# Patient Record
Sex: FEMALE
[De-identification: names, ages, dates, MRNs, and addresses within clinical notes are randomized per-mention and may not be internally consistent; named-entity substitution may affect disease eponyms.]

---

## 2020-04-28 ENCOUNTER — NURSE TRIAGE (OUTPATIENT)
Dept: OTHER | Facility: CLINIC | Age: 25
End: 2020-04-28

## 2020-04-29 NOTE — TELEPHONE ENCOUNTER
Had  on 3/8/2020, having vaginally bleeding. Stopped post partum  Bleeding, has had period since baby was born. States it is more like spotting. Bleeding is not painful. Instructed to call her OBGYN office and speak with the on-call physician tonight.     Reason for Disposition   [1] Menstrual cycle < 21 days OR > 35 days AND [2] occurs more than two cycles (2 months) this past year    Protocols used: VAGINAL BLEEDING - ABNORMAL-ADULT-

## 2023-10-20 ENCOUNTER — HOSPITAL ENCOUNTER (EMERGENCY)
Facility: HOSPITAL | Age: 28
Discharge: HOME | End: 2023-10-20
Payer: COMMERCIAL

## 2023-10-20 VITALS
HEART RATE: 95 BPM | DIASTOLIC BLOOD PRESSURE: 70 MMHG | OXYGEN SATURATION: 100 % | SYSTOLIC BLOOD PRESSURE: 115 MMHG | BODY MASS INDEX: 21.71 KG/M2 | RESPIRATION RATE: 20 BRPM | HEIGHT: 61 IN | WEIGHT: 115 LBS | TEMPERATURE: 96.8 F

## 2023-10-20 LAB
ALBUMIN SERPL BCP-MCNC: 4.9 G/DL (ref 3.4–5)
ALP SERPL-CCNC: 67 U/L (ref 33–110)
ALT SERPL W P-5'-P-CCNC: 20 U/L (ref 7–45)
ANION GAP SERPL CALC-SCNC: 19 MMOL/L (ref 10–20)
APPEARANCE UR: ABNORMAL
AST SERPL W P-5'-P-CCNC: 20 U/L (ref 9–39)
BASOPHILS # BLD AUTO: 0.04 X10*3/UL (ref 0–0.1)
BASOPHILS NFR BLD AUTO: 0.2 %
BILIRUB SERPL-MCNC: 0.9 MG/DL (ref 0–1.2)
BILIRUB UR STRIP.AUTO-MCNC: NEGATIVE MG/DL
BUN SERPL-MCNC: 17 MG/DL (ref 6–23)
CALCIUM SERPL-MCNC: 9.9 MG/DL (ref 8.6–10.3)
CHLORIDE SERPL-SCNC: 105 MMOL/L (ref 98–107)
CO2 SERPL-SCNC: 19 MMOL/L (ref 21–32)
COLOR UR: YELLOW
CREAT SERPL-MCNC: 1.02 MG/DL (ref 0.5–1.05)
EOSINOPHIL # BLD AUTO: 0 X10*3/UL (ref 0–0.7)
EOSINOPHIL NFR BLD AUTO: 0 %
ERYTHROCYTE [DISTWIDTH] IN BLOOD BY AUTOMATED COUNT: 12.3 % (ref 11.5–14.5)
GFR SERPL CREATININE-BSD FRML MDRD: 77 ML/MIN/1.73M*2
GLUCOSE SERPL-MCNC: 107 MG/DL (ref 74–99)
GLUCOSE UR STRIP.AUTO-MCNC: NEGATIVE MG/DL
HCG UR QL IA.RAPID: NEGATIVE
HCT VFR BLD AUTO: 39.4 % (ref 36–46)
HGB BLD-MCNC: 12.9 G/DL (ref 12–16)
HOLD SPECIMEN: NORMAL
IMM GRANULOCYTES # BLD AUTO: 0.09 X10*3/UL (ref 0–0.7)
IMM GRANULOCYTES NFR BLD AUTO: 0.4 % (ref 0–0.9)
KETONES UR STRIP.AUTO-MCNC: ABNORMAL MG/DL
LEUKOCYTE ESTERASE UR QL STRIP.AUTO: ABNORMAL
LIPASE SERPL-CCNC: 28 U/L (ref 9–82)
LYMPHOCYTES # BLD AUTO: 0.89 X10*3/UL (ref 1.2–4.8)
LYMPHOCYTES NFR BLD AUTO: 4.2 %
MCH RBC QN AUTO: 28.7 PG (ref 26–34)
MCHC RBC AUTO-ENTMCNC: 32.7 G/DL (ref 32–36)
MCV RBC AUTO: 88 FL (ref 80–100)
MONOCYTES # BLD AUTO: 1.19 X10*3/UL (ref 0.1–1)
MONOCYTES NFR BLD AUTO: 5.6 %
NEUTROPHILS # BLD AUTO: 19.12 X10*3/UL (ref 1.2–7.7)
NEUTROPHILS NFR BLD AUTO: 89.6 %
NITRITE UR QL STRIP.AUTO: NEGATIVE
NRBC BLD-RTO: 0 /100 WBCS (ref 0–0)
PH UR STRIP.AUTO: 6.5 [PH]
PLATELET # BLD AUTO: 363 X10*3/UL (ref 150–450)
PMV BLD AUTO: 10.2 FL (ref 7.5–11.5)
POTASSIUM SERPL-SCNC: 3.5 MMOL/L (ref 3.5–5.3)
PROT SERPL-MCNC: 7.4 G/DL (ref 6.4–8.2)
PROT UR STRIP.AUTO-MCNC: ABNORMAL MG/DL
RBC # BLD AUTO: 4.49 X10*6/UL (ref 4–5.2)
RBC # UR STRIP.AUTO: ABNORMAL /UL
SODIUM SERPL-SCNC: 139 MMOL/L (ref 136–145)
SP GR UR STRIP.AUTO: 1.02
UROBILINOGEN UR STRIP.AUTO-MCNC: ABNORMAL MG/DL
WBC # BLD AUTO: 21.3 X10*3/UL (ref 4.4–11.3)

## 2023-10-20 PROCEDURE — 87086 URINE CULTURE/COLONY COUNT: CPT | Mod: CMCLAB,PARLAB | Performed by: NURSE PRACTITIONER

## 2023-10-20 PROCEDURE — 99284 EMERGENCY DEPT VISIT MOD MDM: CPT

## 2023-10-20 PROCEDURE — 99283 EMERGENCY DEPT VISIT LOW MDM: CPT

## 2023-10-20 PROCEDURE — 81003 URINALYSIS AUTO W/O SCOPE: CPT | Performed by: NURSE PRACTITIONER

## 2023-10-20 PROCEDURE — 80053 COMPREHEN METABOLIC PANEL: CPT | Performed by: NURSE PRACTITIONER

## 2023-10-20 PROCEDURE — 81025 URINE PREGNANCY TEST: CPT | Performed by: NURSE PRACTITIONER

## 2023-10-20 PROCEDURE — 36415 COLL VENOUS BLD VENIPUNCTURE: CPT | Performed by: NURSE PRACTITIONER

## 2023-10-20 PROCEDURE — 83690 ASSAY OF LIPASE: CPT | Performed by: NURSE PRACTITIONER

## 2023-10-20 PROCEDURE — 85025 COMPLETE CBC W/AUTO DIFF WBC: CPT | Performed by: NURSE PRACTITIONER

## 2023-10-20 PROCEDURE — 99281 EMR DPT VST MAYX REQ PHY/QHP: CPT

## 2023-10-20 ASSESSMENT — COLUMBIA-SUICIDE SEVERITY RATING SCALE - C-SSRS
1. IN THE PAST MONTH, HAVE YOU WISHED YOU WERE DEAD OR WISHED YOU COULD GO TO SLEEP AND NOT WAKE UP?: NO
2. HAVE YOU ACTUALLY HAD ANY THOUGHTS OF KILLING YOURSELF?: NO
6. HAVE YOU EVER DONE ANYTHING, STARTED TO DO ANYTHING, OR PREPARED TO DO ANYTHING TO END YOUR LIFE?: NO

## 2023-10-20 NOTE — ED TRIAGE NOTES
PIT Note    Secondary to patient volumes and overcrowding, I performed a brief medical screening exam of the patient in triage, as the patient awaits space in the main ED.    History of Present Illness:  Macie Gould is a 27-year-old female with history of ureteral calculi and prior  who presents to ED today from home by herself for evaluation of left flank pain.  2 weeks ago the patient had pain in the left flank that resolved, today the pain resumed and is described as a sharp stabbing sensation in the left lateral abdomen that is currently rated 8/10 with nausea/vomiting, hematuria and chills.  Denies fever, cough/cold symptoms, chest pain, shortness of breath, dysuria, change in bowel habits or any other complaints.  No smoking or drug use.  Occasional EtOH.    Physical Exam:  General -young female, sitting in the exam room, leaning over on the table complaining of pain.  Nontoxic looking but appears uncomfortable.  Alert and oriented x3.  Well-hydrated and nourished.  Respiratory - Breathing comfortably  Cardiac -regular rate and rhythm  Abdomen -soft with bowel sounds, nontender.  Mild left CVA tenderness.  Neurologic -no focal neurological deficits, movements of extremities x4.  Normal gait.  Skin -Pink, warm and dry.      Medical Decision Makin-year-old female with history of ureteral calculi and prior C-sections evaluated the bedside for left flank pain with nausea/vomiting/hematuria and chills.  On arrival to the ED, awake and alert, vital signs within normal limits.  Afebrile.  Mild left-sided CVA tenderness, patient is extremely uncomfortable.  IV established, basic labs, UA/urine culture, urine pregnancy and noncontrast CT and/pelvis will be performed in preparation for further evaluation in the main ED.  Patient is agreeable to this plan.    The patient demonstrates understanding that this initial evaluation is a brief medical screening exam and the expectation is that they await for  space in the main ED to be further evaluated.  The patient understands that, if they leave prior to further evaluation in the main ED after this initial evaluation in triage, they are doing so under their own accord knowing that their evaluation/work-up is not yet complete. The patient also understands that any preliminary diagnostic results, including abnormalities, may not be shared with them, if they choose to leave prior to further evaluation in the main ED.

## 2023-10-20 NOTE — ED TRIAGE NOTES
Pt presents to ED from home c/o left flank pain and blood in urine that began today. Pt states a few weeks ago she went to urgent care for urinary symptoms and was given abx. Pt reports hx of kidney stones. Pt vomiting at the time of triage. Denies chance of pregnancy

## 2023-10-22 LAB — BACTERIA UR CULT: NO GROWTH

## 2023-10-23 ENCOUNTER — OFFICE VISIT (OUTPATIENT)
Dept: URGENT CARE | Facility: CLINIC | Age: 28
End: 2023-10-23
Payer: COMMERCIAL

## 2023-10-23 VITALS
RESPIRATION RATE: 16 BRPM | WEIGHT: 115 LBS | SYSTOLIC BLOOD PRESSURE: 109 MMHG | DIASTOLIC BLOOD PRESSURE: 75 MMHG | TEMPERATURE: 97.7 F | OXYGEN SATURATION: 98 % | HEART RATE: 86 BPM | BODY MASS INDEX: 21.73 KG/M2

## 2023-10-23 DIAGNOSIS — R31.9 HEMATURIA, UNSPECIFIED TYPE: ICD-10-CM

## 2023-10-23 LAB
POC APPEARANCE, URINE: ABNORMAL
POC BILIRUBIN, URINE: ABNORMAL
POC BLOOD, URINE: ABNORMAL
POC COLOR, URINE: ABNORMAL
POC GLUCOSE, URINE: NEGATIVE MG/DL
POC KETONES, URINE: ABNORMAL MG/DL
POC LEUKOCYTES, URINE: NEGATIVE
POC NITRITE,URINE: NEGATIVE
POC PH, URINE: 6 PH
POC PROTEIN, URINE: ABNORMAL MG/DL
POC SPECIFIC GRAVITY, URINE: 1.02
POC UROBILINOGEN, URINE: 0.2 EU/DL
PREGNANCY TEST URINE, POC: NEGATIVE

## 2023-10-23 PROCEDURE — 99204 OFFICE O/P NEW MOD 45 MIN: CPT | Performed by: PHYSICIAN ASSISTANT

## 2023-10-23 PROCEDURE — 81025 URINE PREGNANCY TEST: CPT | Performed by: PHYSICIAN ASSISTANT

## 2023-10-23 PROCEDURE — 1036F TOBACCO NON-USER: CPT | Performed by: PHYSICIAN ASSISTANT

## 2023-10-23 PROCEDURE — 81002 URINALYSIS NONAUTO W/O SCOPE: CPT | Performed by: PHYSICIAN ASSISTANT

## 2023-10-23 RX ORDER — TAMSULOSIN HYDROCHLORIDE 0.4 MG/1
0.4 CAPSULE ORAL DAILY
Qty: 14 CAPSULE | Refills: 0 | Status: SHIPPED | OUTPATIENT
Start: 2023-10-23 | End: 2023-11-06

## 2023-10-23 ASSESSMENT — PAIN SCALES - GENERAL: PAINLEVEL: 2

## 2023-10-23 NOTE — PATIENT INSTRUCTIONS
Assessment/Plan   Problem List Items Addressed This Visit    None  Visit Diagnoses       Hematuria, unspecified type        Relevant Orders    POCT pregnancy, urine manually resulted (Completed)    POCT UA (nonautomated w/o microscopy) manually resulted (Completed)        -I discussed with patient that the history is consistent with possible uretolithiasis  -Symptoms improved though the hematuria has persisted.  -I discussed with patient that having some lingering hematuria after passage of kidney stone is to be expected but I cautioned her that this should be resolving over the course of the next week.  -If there is any persistent blood in the urine I would recommend that she has follow-up with her primary care doctor  -I have sent Flomax in case she has any more colicky flank pain  -Urinalysis does not raise large concern for urinary tract infection but I have sent the urine for culture and sensitivity report to rule this out  -If the patient has significant onset of pain that is not controlled with NSAIDs or Tylenol I have advised her to present to the emergency room for further evaluation and management

## 2023-10-23 NOTE — PROGRESS NOTES
Subjective   Patient ID: Macie Gould is a 27 y.o. female who presents for Blood in Urine.  The patient notes that she was having colicky left-sided flank pain on Saturday which has since resolved but now she has developed hematuria.  She denies ever having any pain at the urethra.  Denies any fevers or chills.  Denies any concerns for pregnancy as she is not currently sexually active with a male partner.  She denies any vaginal bleeding or discharge.  Denies any chest pain shortness of breath.  She does note that she has a remote history of kidney stone that passed on its own when she was a teenager.  She otherwise notes that she does drink coffee on a regular basis and voices understanding of how this may contribute to development of kidney stones.    No past medical history on file.      The remainder of the systems were reviewed and are negative unless noted above      Objective   /75   Pulse 86   Temp 36.5 °C (97.7 °F) (Temporal)   Resp 16   Wt 52.2 kg (115 lb)   SpO2 98%   BMI 21.73 kg/m²   Physical Exam  Constitutional:       General: She is not in acute distress.     Appearance: Normal appearance. She is not ill-appearing, toxic-appearing or diaphoretic.   HENT:      Head: Normocephalic and atraumatic.      Mouth/Throat:      Mouth: Mucous membranes are moist.      Pharynx: Oropharynx is clear.   Eyes:      Conjunctiva/sclera: Conjunctivae normal.   Cardiovascular:      Rate and Rhythm: Normal rate and regular rhythm.      Heart sounds: No murmur heard.  Pulmonary:      Effort: Pulmonary effort is normal. No respiratory distress.      Breath sounds: Normal breath sounds. No wheezing.   Abdominal:      Tenderness: There is no right CVA tenderness, left CVA tenderness or guarding.   Musculoskeletal:         General: No swelling, tenderness, deformity or signs of injury. Normal range of motion.      Cervical back: Normal range of motion and neck supple.   Skin:     General: Skin is warm and dry.       Findings: No erythema or rash.   Neurological:      General: No focal deficit present.      Mental Status: She is alert and oriented to person, place, and time.      Gait: Gait normal.         Assessment/Plan   Problem List Items Addressed This Visit    None  Visit Diagnoses       Hematuria, unspecified type        Relevant Orders    POCT pregnancy, urine manually resulted (Completed)    POCT UA (nonautomated w/o microscopy) manually resulted (Completed)        -I discussed with patient that the history is consistent with possible uretolithiasis  -Symptoms improved though the hematuria has persisted.  -I discussed with patient that having some lingering hematuria after passage of kidney stone is to be expected but I cautioned her that this should be resolving over the course of the next week.  -If there is any persistent blood in the urine I would recommend that she has follow-up with her primary care doctor  -I have sent Flomax in case she has any more colicky flank pain  -Urinalysis does not raise large concern for urinary tract infection but I have sent the urine for culture and sensitivity report to rule this out  -If the patient has significant onset of pain that is not controlled with NSAIDs or Tylenol I have advised her to present to the emergency room for further evaluation and management